# Patient Record
Sex: FEMALE | Race: OTHER | NOT HISPANIC OR LATINO
[De-identification: names, ages, dates, MRNs, and addresses within clinical notes are randomized per-mention and may not be internally consistent; named-entity substitution may affect disease eponyms.]

---
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
## 2020-09-03 NOTE — ED PROVIDER NOTE - CPE EDP PSYCH NORM
- - - Dorsal Nasal Flap Text: The defect edges were debeveled with a #15 scalpel blade.  Given the location of the defect and the proximity to free margins a dorsal nasal flap was deemed most appropriate.  Using a sterile surgical marker, an appropriate dorsal nasal flap was drawn around the defect.    The area thus outlined was incised deep to adipose tissue with a #15 scalpel blade.  The skin margins were undermined to an appropriate distance in all directions utilizing iris scissors.

## 2021-04-13 ENCOUNTER — RESULT REVIEW (OUTPATIENT)
Age: 21
End: 2021-04-13

## 2021-05-28 ENCOUNTER — TRANSCRIPTION ENCOUNTER (OUTPATIENT)
Age: 21
End: 2021-05-28

## 2021-11-03 ENCOUNTER — TRANSCRIPTION ENCOUNTER (OUTPATIENT)
Age: 21
End: 2021-11-03

## 2021-11-21 ENCOUNTER — TRANSCRIPTION ENCOUNTER (OUTPATIENT)
Age: 21
End: 2021-11-21

## 2021-11-23 ENCOUNTER — EMERGENCY (EMERGENCY)
Facility: HOSPITAL | Age: 21
LOS: 1 days | Discharge: HOME | End: 2021-11-23
Attending: EMERGENCY MEDICINE | Admitting: EMERGENCY MEDICINE
Payer: COMMERCIAL

## 2021-11-23 ENCOUNTER — APPOINTMENT (OUTPATIENT)
Dept: OTOLARYNGOLOGY | Facility: CLINIC | Age: 21
End: 2021-11-23
Payer: COMMERCIAL

## 2021-11-23 VITALS
WEIGHT: 149.47 LBS | OXYGEN SATURATION: 97 % | HEIGHT: 63 IN | RESPIRATION RATE: 16 BRPM | HEART RATE: 90 BPM | SYSTOLIC BLOOD PRESSURE: 125 MMHG | TEMPERATURE: 98 F | DIASTOLIC BLOOD PRESSURE: 78 MMHG

## 2021-11-23 VITALS
HEART RATE: 81 BPM | DIASTOLIC BLOOD PRESSURE: 72 MMHG | BODY MASS INDEX: 24.8 KG/M2 | SYSTOLIC BLOOD PRESSURE: 111 MMHG | HEIGHT: 63 IN | WEIGHT: 140 LBS

## 2021-11-23 VITALS
RESPIRATION RATE: 17 BRPM | SYSTOLIC BLOOD PRESSURE: 99 MMHG | HEART RATE: 88 BPM | DIASTOLIC BLOOD PRESSURE: 65 MMHG | TEMPERATURE: 98 F | OXYGEN SATURATION: 98 %

## 2021-11-23 DIAGNOSIS — J03.90 ACUTE TONSILLITIS, UNSPECIFIED: ICD-10-CM

## 2021-11-23 LAB
ALBUMIN SERPL ELPH-MCNC: 3.3 G/DL — SIGNIFICANT CHANGE UP (ref 3.3–5)
ALP SERPL-CCNC: 73 U/L — SIGNIFICANT CHANGE UP (ref 30–120)
ALT FLD-CCNC: 14 U/L DA — SIGNIFICANT CHANGE UP (ref 10–60)
ANION GAP SERPL CALC-SCNC: 13 MMOL/L — SIGNIFICANT CHANGE UP (ref 5–17)
AST SERPL-CCNC: 12 U/L — SIGNIFICANT CHANGE UP (ref 10–40)
BASOPHILS # BLD AUTO: 0.06 K/UL — SIGNIFICANT CHANGE UP (ref 0–0.2)
BASOPHILS NFR BLD AUTO: 0.6 % — SIGNIFICANT CHANGE UP (ref 0–2)
BILIRUB SERPL-MCNC: 0.4 MG/DL — SIGNIFICANT CHANGE UP (ref 0.2–1.2)
BUN SERPL-MCNC: 6 MG/DL — LOW (ref 7–23)
CALCIUM SERPL-MCNC: 8.5 MG/DL — SIGNIFICANT CHANGE UP (ref 8.4–10.5)
CHLORIDE SERPL-SCNC: 99 MMOL/L — SIGNIFICANT CHANGE UP (ref 96–108)
CO2 SERPL-SCNC: 25 MMOL/L — SIGNIFICANT CHANGE UP (ref 22–31)
CREAT SERPL-MCNC: 0.71 MG/DL — SIGNIFICANT CHANGE UP (ref 0.5–1.3)
EBV EA AB SER IA-ACNC: <5 U/ML — SIGNIFICANT CHANGE UP
EBV EA AB TITR SER IF: NEGATIVE — SIGNIFICANT CHANGE UP
EBV EA IGG SER-ACNC: NEGATIVE — SIGNIFICANT CHANGE UP
EBV NA IGG SER IA-ACNC: <3 U/ML — SIGNIFICANT CHANGE UP
EBV PATRN SPEC IB-IMP: SIGNIFICANT CHANGE UP
EBV VCA IGG AVIDITY SER QL IA: POSITIVE
EBV VCA IGM SER IA-ACNC: 32 U/ML — HIGH
EBV VCA IGM SER IA-ACNC: >160 U/ML — HIGH
EBV VCA IGM TITR FLD: POSITIVE
EOSINOPHIL # BLD AUTO: 0.02 K/UL — SIGNIFICANT CHANGE UP (ref 0–0.5)
EOSINOPHIL NFR BLD AUTO: 0.2 % — SIGNIFICANT CHANGE UP (ref 0–6)
GLUCOSE SERPL-MCNC: 114 MG/DL — HIGH (ref 70–99)
HCG UR QL: NEGATIVE — SIGNIFICANT CHANGE UP
HCT VFR BLD CALC: 34.1 % — LOW (ref 34.5–45)
HETEROPH AB TITR SER AGGL: NEGATIVE — SIGNIFICANT CHANGE UP
HGB BLD-MCNC: 11.8 G/DL — SIGNIFICANT CHANGE UP (ref 11.5–15.5)
IMM GRANULOCYTES NFR BLD AUTO: 0.3 % — SIGNIFICANT CHANGE UP (ref 0–1.5)
LYMPHOCYTES # BLD AUTO: 3.31 K/UL — HIGH (ref 1–3.3)
LYMPHOCYTES # BLD AUTO: 33.3 % — SIGNIFICANT CHANGE UP (ref 13–44)
MCHC RBC-ENTMCNC: 29.9 PG — SIGNIFICANT CHANGE UP (ref 27–34)
MCHC RBC-ENTMCNC: 34.6 GM/DL — SIGNIFICANT CHANGE UP (ref 32–36)
MCV RBC AUTO: 86.3 FL — SIGNIFICANT CHANGE UP (ref 80–100)
MONOCYTES # BLD AUTO: 1.02 K/UL — HIGH (ref 0–0.9)
MONOCYTES NFR BLD AUTO: 10.3 % — SIGNIFICANT CHANGE UP (ref 2–14)
NEUTROPHILS # BLD AUTO: 5.51 K/UL — SIGNIFICANT CHANGE UP (ref 1.8–7.4)
NEUTROPHILS NFR BLD AUTO: 55.3 % — SIGNIFICANT CHANGE UP (ref 43–77)
NRBC # BLD: 0 /100 WBCS — SIGNIFICANT CHANGE UP (ref 0–0)
PLATELET # BLD AUTO: 232 K/UL — SIGNIFICANT CHANGE UP (ref 150–400)
POTASSIUM SERPL-MCNC: 3.3 MMOL/L — LOW (ref 3.5–5.3)
POTASSIUM SERPL-SCNC: 3.3 MMOL/L — LOW (ref 3.5–5.3)
PROT SERPL-MCNC: 7.3 G/DL — SIGNIFICANT CHANGE UP (ref 6–8.3)
RBC # BLD: 3.95 M/UL — SIGNIFICANT CHANGE UP (ref 3.8–5.2)
RBC # FLD: 12 % — SIGNIFICANT CHANGE UP (ref 10.3–14.5)
SODIUM SERPL-SCNC: 137 MMOL/L — SIGNIFICANT CHANGE UP (ref 135–145)
WBC # BLD: 9.95 K/UL — SIGNIFICANT CHANGE UP (ref 3.8–10.5)
WBC # FLD AUTO: 9.95 K/UL — SIGNIFICANT CHANGE UP (ref 3.8–10.5)

## 2021-11-23 PROCEDURE — 99284 EMERGENCY DEPT VISIT MOD MDM: CPT | Mod: 25

## 2021-11-23 PROCEDURE — 86665 EPSTEIN-BARR CAPSID VCA: CPT

## 2021-11-23 PROCEDURE — 96365 THER/PROPH/DIAG IV INF INIT: CPT | Mod: XU

## 2021-11-23 PROCEDURE — 85025 COMPLETE CBC W/AUTO DIFF WBC: CPT

## 2021-11-23 PROCEDURE — 99203 OFFICE O/P NEW LOW 30 MIN: CPT

## 2021-11-23 PROCEDURE — 86664 EPSTEIN-BARR NUCLEAR ANTIGEN: CPT

## 2021-11-23 PROCEDURE — 86663 EPSTEIN-BARR ANTIBODY: CPT

## 2021-11-23 PROCEDURE — 96366 THER/PROPH/DIAG IV INF ADDON: CPT

## 2021-11-23 PROCEDURE — 86308 HETEROPHILE ANTIBODY SCREEN: CPT

## 2021-11-23 PROCEDURE — 80053 COMPREHEN METABOLIC PANEL: CPT

## 2021-11-23 PROCEDURE — 70491 CT SOFT TISSUE NECK W/DYE: CPT | Mod: 26,MA

## 2021-11-23 PROCEDURE — 96375 TX/PRO/DX INJ NEW DRUG ADDON: CPT

## 2021-11-23 PROCEDURE — 70491 CT SOFT TISSUE NECK W/DYE: CPT | Mod: MA

## 2021-11-23 PROCEDURE — 36415 COLL VENOUS BLD VENIPUNCTURE: CPT

## 2021-11-23 PROCEDURE — 99285 EMERGENCY DEPT VISIT HI MDM: CPT

## 2021-11-23 PROCEDURE — 81025 URINE PREGNANCY TEST: CPT

## 2021-11-23 RX ORDER — CLINDAMYCIN HYDROCHLORIDE 300 MG/1
300 CAPSULE ORAL
Qty: 30 | Refills: 2 | Status: ACTIVE | COMMUNITY
Start: 2021-11-23 | End: 1900-01-01

## 2021-11-23 RX ORDER — SODIUM CHLORIDE 9 MG/ML
1000 INJECTION INTRAMUSCULAR; INTRAVENOUS; SUBCUTANEOUS ONCE
Refills: 0 | Status: COMPLETED | OUTPATIENT
Start: 2021-11-23 | End: 2021-11-23

## 2021-11-23 RX ORDER — KETOROLAC TROMETHAMINE 30 MG/ML
30 SYRINGE (ML) INJECTION ONCE
Refills: 0 | Status: DISCONTINUED | OUTPATIENT
Start: 2021-11-23 | End: 2021-11-23

## 2021-11-23 RX ORDER — METHYLPREDNISOLONE 4 MG/1
4 TABLET ORAL
Qty: 1 | Refills: 1 | Status: ACTIVE | COMMUNITY
Start: 2021-11-23 | End: 1900-01-01

## 2021-11-23 RX ORDER — DEXAMETHASONE 0.5 MG/5ML
10 ELIXIR ORAL ONCE
Refills: 0 | Status: COMPLETED | OUTPATIENT
Start: 2021-11-23 | End: 2021-11-23

## 2021-11-23 RX ADMIN — Medication 100 MILLIGRAM(S): at 07:48

## 2021-11-23 RX ADMIN — Medication 10 MILLIGRAM(S): at 07:49

## 2021-11-23 RX ADMIN — SODIUM CHLORIDE 1000 MILLILITER(S): 9 INJECTION INTRAMUSCULAR; INTRAVENOUS; SUBCUTANEOUS at 05:35

## 2021-11-23 RX ADMIN — Medication 30 MILLIGRAM(S): at 05:35

## 2021-11-23 RX ADMIN — Medication 30 MILLIGRAM(S): at 07:49

## 2021-11-23 RX ADMIN — SODIUM CHLORIDE 1000 MILLILITER(S): 9 INJECTION INTRAMUSCULAR; INTRAVENOUS; SUBCUTANEOUS at 07:49

## 2021-11-23 RX ADMIN — Medication 102 MILLIGRAM(S): at 05:35

## 2021-11-23 NOTE — PHYSICAL EXAM
[Midline] : trachea located in midline position [de-identified] : 3+ inflamed tonsils with white exudate [Normal] : no rashes [de-identified] : Bilateral tender cervical lymphadenopathy

## 2021-11-23 NOTE — ED PROVIDER NOTE - NSFOLLOWUPINSTRUCTIONS_ED_ALL_ED_FT
Tonsillitis    WHAT YOU NEED TO KNOW:    What is tonsillitis? Tonsillitis is inflammation of your tonsils. Tonsils are the lumps of tissue on both sides of the back of your throat. Tonsils are part of your immune system. They help you fight infections. Recurrent tonsillitis is when you have tonsillitis many times in 1 year. Chronic tonsillitis is when you have a sore throat that lasts 3 months or longer.     Mouth Anatomy         What causes tonsillitis? Tonsillitis may be caused by a bacterial or a viral infection. Tonsillitis can spread from an infected person to others through coughing, sneezing, or touching. It can also spread through kissing or sharing food and drinks.     What are the signs and symptoms of tonsillitis?   •Severe sore throat      •Red, swollen tonsils      •Painful swallowing      •Fever and chills      •Bad breath      •White spots on the tonsils      How is tonsillitis diagnosed? Your healthcare provider will examine your ears, nose, and throat. He will ask about your symptoms. You may need any of the following:   •A throat culture may show which germ is causing your illness. A cotton swab is rubbed against the back of your throat.      •Blood tests may show if you have an infection caused by bacteria or a virus.      How is tonsillitis treated? Treatment may decrease your signs and symptoms. Treatment also may lower the number of times that you get tonsillitis in a year. You may need any of the following:   •Acetaminophen decreases pain and fever. It is available without a doctor's order. Ask how much to take and how often to take it. Follow directions. Acetaminophen can cause liver damage if not taken correctly.      •NSAIDs, such as ibuprofen, help decrease swelling, pain, and fever. This medicine is available with or without a doctor's order. NSAIDs can cause stomach bleeding or kidney problems in certain people. If you take blood thinner medicine, always ask your healthcare provider if NSAIDs are safe for you. Always read the medicine label and follow directions.      •Antibiotics help treat a bacterial infection.      •A tonsillectomy is surgery to remove your tonsils. You may need surgery if you have chronic or recurrent tonsillitis. Surgery may also be done if antibiotics are not working.      How can I manage my symptoms?   •Rest when you feel it is needed. Slowly start to do more each day. Return to your daily activities as directed.       •Drink liquids as directed. You may need to drink more liquid than usual to prevent dehydration. Ask how much liquid to drink each day and which liquids are best for you.       •Gargle with warm salt water. This may help decrease throat pain. Mix 1 teaspoon of salt in 8 ounces of warm water. Ask how often you should do this.      •Prevent the spread of germs. Wash your hands often. Do not share food or drinks with anyone. You may be able to return to work when you feel better and your fever is gone for at least 24 hours.      Call 911 for the following:   •You have trouble breathing because your tonsils are swollen.          When should I contact my healthcare provider?   •You have a fever.      •Your pain gets worse or does not get better after you take pain medicine.      •Your sore throat is not better after you have finished antibiotic treatment.      •You have trouble sleeping and wake up trying to catch your breath.      •You have questions or concerns about your condition or care.      CARE AGREEMENT:    You have the right to help plan your care. Learn about your health condition and how it may be treated. Discuss treatment options with your healthcare providers to decide what care you want to receive. You always have the right to refuse treatment.

## 2021-11-23 NOTE — ED PROVIDER NOTE - CARE PROVIDER_API CALL
Jude Skelton)  [facility] [address], [address]  [place] [address]  Phone: [phone]  Fax: [phone]  Follow Up Time:    Jude Skelton)  [facility] [facility], [address]  [place] [address]  Phone: [phone]  Fax: [phone]  Scheduled Appointment: 11/23/2021 12:00 PM

## 2021-11-23 NOTE — ASSESSMENT
[FreeTextEntry1] : Bev Ramos presents with an episode of acute tonsillitis. She has had some benefit from IV steroid and clindamycin in the ED on 11/21. Will treat her with a course of oral antibiotics and steroids.\par \par - Start clindamycin 300 mg TID x 10 days.\par - Start medrol dose pack\par - Follow up in 2 weeks.

## 2021-11-23 NOTE — DATA REVIEWED
[de-identified] : CT Neck:\par \par FINDINGS:\par The palatine tonsils are markedly enlarged and contacting each other in the midline, so-called kissing tonsils. There is overall striated enhancement pattern to the tonsils. There is a discrete 6 x 3 mm region of low-attenuation along the lateral aspect of the right palatine tonsil which may have subtle peripheral enhancement. Less well-defined vague area of low-attenuation is seen more medially within the tonsil measuring about 1.3 cm. There is moderate mass effect on the oropharyngeal airway. There is asymmetric stranding/inflammation in the right parapharyngeal fat. The nasopharyngeal soft tissues and lingual tonsils are also symmetrically prominent. There is no evidence of retropharyngeal abscess or edema. The supraglottic, glottic and subglottic airways are unremarkable.\par \par Evaluation of the various allison stations demonstrates prominent bilateral level 2A and 2B lymph nodes, largest being jugulodigastric digastric nodes measuring 3.5 cm on the right and 3.0 cm the left. There is no suppurative adenopathy.\par \par The parotid, submandibular, and thyroid glands are unremarkable.\par \par The carotid and vertebral arteries are patent.\par \par The visualized intracranial and intraorbital compartments are unremarkable.\par \par There is no lucent or sclerotic lesion.\par \par The visualized lung apices are clear.\par \par IMPRESSION:\par Tonsillitis with moderate airway narrowing.\par 6 x 3 mm well-defined cystic area in the lateral right palatine tonsil which could be a dilated tonsillar crypt or early peritonsillar formation. Phlegmonous changes with early abscess formation question in the medial right palatine tonsil.\par \par Reactive right parapharyngeal edema and upper cervical adenopathy.

## 2021-11-23 NOTE — ED ADULT TRIAGE NOTE - CHIEF COMPLAINT QUOTE
'My throat is swollen, I went to urgent care on Sunday, they said its not strep ,it's not covid, my throat hurts', c/o throat pain x1 week, denies fever.

## 2021-11-23 NOTE — ED PROVIDER NOTE - PATIENT PORTAL LINK FT
You can access the FollowMyHealth Patient Portal offered by [facility] by registering at the following website: [email or web address]. By joining [facility]’s FollowMyHealth portal, you will also be able to view your health information using other applications (apps) compatible with our system.

## 2021-11-23 NOTE — ED PROVIDER NOTE - NSICDXFAMILYHX_GEN_ALL_CORE_FT
FAMILY HISTORY:  Mother  Still living? Yes, Estimated age: Age Unknown  Family history of major depression, Age at diagnosis: Age Unknown

## 2021-11-23 NOTE — HISTORY OF PRESENT ILLNESS
[de-identified] : Bev Ramos is a 22 yo female who was referred by the ED For tonsillitis. She states that for the past 1.5 weeks, she had sore throat. In the last 2-3 days, she felt tonsillar swelling. She was seen by urgent care, and was started on amoxicillin. She had increased pain and was seen by [facility]. She was given topical steroid which seemed to help. However, last night, she had recurrence of pain with increased odynophagia. She noticed some difficulty breathing because of nasal congestion. She denies dysphagia. She was seen in the ED, and was given IV steroid and clindamycin. She had a CT which showed bilateral tonsillar hypertrophy with subcentimer phlegmonous change in the right peritonsillar space as well as bilateral cervical lymphadenopathy. Region of low attenuation wthin rihgt tonsil.\par \par She denies any fevers or chills. Rapid strep was negative at urgent care. Her monospot is pending from [place] [facility]. Her Covid test was negative. She denies history of recurrent tonsillitis. She denies history of peritonsillar abscess. She denies snoring or pauses in breathing at night. She notes bilateral cervical lymphadenopathy. She has been prescribed clindamycin.

## 2021-11-23 NOTE — ED PROVIDER NOTE - ENMT, MLM
Airway patent, tonsils equally enlarged bilaterally with small patency between them, bilateral tonsillar exudates, no drooling

## 2021-11-23 NOTE — ED PROVIDER NOTE - NOTES
discussed case, reviewed images, no need for transfer, likely viral but would cover with clinda, will help arrange f/u for today in [place] [facility] - will call back shortly discussed case with Dr. Castaneda, reviewed images, no need for transfer, likely viral but would cover with clinda, will help arrange f/u for today in [place] [facility] - will call back shortly

## 2021-11-23 NOTE — REVIEW OF SYSTEMS
[Seasonal Allergies] : seasonal allergies [Nasal Congestion] : nasal congestion [Swelling Neck] : swelling neck [Swelling Face] : face swelling [Swollen Glands] : swollen glands [Negative] : Endocrine [FreeTextEntry7] : difficulty swallowing  [FreeTextEntry1] : headache

## 2021-11-23 NOTE — ED PROVIDER NOTE - OBJECTIVE STATEMENT
21 y.o. F c/o tonsil swelling, started with sore throat about 1 wk ago, increased pain 2d ago, seen at urgent care ([facility]), had negative covid, negative strep, was started on amoxicillin 500mg BID. later that day went to [facility], states they sprayed a steroid on her tonsils, now in ED for continued swelling of tonsils and pain, 21 y.o. F c/o tonsil swelling, started with sore throat about 1 wk ago, increased pain 2d ago, seen at urgent care ([facility]), had negative covid, negative strep, was started on amoxicillin 500mg BID. later that day went to [facility] for [facility]il swelling, states they sprayed a steroid on her tonsils, now in ED for continued swelling of tonsils and pain, is having pain with swallowing, spitting out saliva, not SOB, is not eating solid food now, but can still drink liquids

## 2021-11-23 NOTE — ED PROVIDER NOTE - PROVIDER TOKENS
PROVIDER:[TOKEN:[[id number]]] PROVIDER:[TOKEN:[[id number]],SCHEDULEDAPPT:[11/23/2021],SCHEDULEDAPPTTIME:[12:00 PM]]

## 2021-11-23 NOTE — ED PROVIDER NOTE - CLINICAL SUMMARY MEDICAL DECISION MAKING FREE TEXT BOX
tonsillitis - worsening, pain to swallow, no sob - iv, labs, mono test, ct neck, iv decadron, iv toradol, NS bolus

## 2021-12-02 ENCOUNTER — TRANSCRIPTION ENCOUNTER (OUTPATIENT)
Age: 21
End: 2021-12-02

## 2021-12-03 ENCOUNTER — TRANSCRIPTION ENCOUNTER (OUTPATIENT)
Age: 21
End: 2021-12-03

## 2021-12-07 ENCOUNTER — APPOINTMENT (OUTPATIENT)
Dept: OTOLARYNGOLOGY | Facility: CLINIC | Age: 21
End: 2021-12-07

## 2021-12-23 ENCOUNTER — TRANSCRIPTION ENCOUNTER (OUTPATIENT)
Age: 21
End: 2021-12-23

## 2022-03-23 ENCOUNTER — TRANSCRIPTION ENCOUNTER (OUTPATIENT)
Age: 22
End: 2022-03-23

## 2022-09-01 ENCOUNTER — RESULT REVIEW (OUTPATIENT)
Age: 22
End: 2022-09-01

## 2022-10-26 ENCOUNTER — NON-APPOINTMENT (OUTPATIENT)
Age: 22
End: 2022-10-26

## 2022-10-28 ENCOUNTER — NON-APPOINTMENT (OUTPATIENT)
Age: 22
End: 2022-10-28

## 2023-01-30 ENCOUNTER — EMERGENCY (EMERGENCY)
Facility: HOSPITAL | Age: 23
LOS: 1 days | Discharge: HOME | End: 2023-01-30
Attending: EMERGENCY MEDICINE | Admitting: EMERGENCY MEDICINE
Payer: COMMERCIAL

## 2023-01-30 ENCOUNTER — OUTPATIENT (OUTPATIENT)
Dept: OUTPATIENT SERVICES | Facility: HOSPITAL | Age: 23
LOS: 1 days | Discharge: STILL ADMITTED OR ADMINISTRATIVE | End: 2023-01-30

## 2023-01-30 VITALS
DIASTOLIC BLOOD PRESSURE: 76 MMHG | TEMPERATURE: 98 F | HEART RATE: 78 BPM | OXYGEN SATURATION: 99 % | SYSTOLIC BLOOD PRESSURE: 134 MMHG | RESPIRATION RATE: 18 BRPM

## 2023-01-30 VITALS
SYSTOLIC BLOOD PRESSURE: 144 MMHG | TEMPERATURE: 98 F | DIASTOLIC BLOOD PRESSURE: 103 MMHG | OXYGEN SATURATION: 98 % | HEART RATE: 84 BPM | RESPIRATION RATE: 19 BRPM

## 2023-01-30 DIAGNOSIS — F19.94 OTHER PSYCHOACTIVE SUBSTANCE USE, UNSPECIFIED WITH PSYCHOACTIVE SUBSTANCE-INDUCED MOOD DISORDER: ICD-10-CM

## 2023-01-30 DIAGNOSIS — F32.A DEPRESSION, UNSPECIFIED: ICD-10-CM

## 2023-01-30 DIAGNOSIS — F33.0 MAJOR DEPRESSIVE DISORDER, RECURRENT, MILD: ICD-10-CM

## 2023-01-30 DIAGNOSIS — F10.10 ALCOHOL ABUSE, UNCOMPLICATED: ICD-10-CM

## 2023-01-30 LAB
ALBUMIN SERPL ELPH-MCNC: 4.3 G/DL — SIGNIFICANT CHANGE UP (ref 3.3–5)
ALP SERPL-CCNC: 73 U/L — SIGNIFICANT CHANGE UP (ref 40–120)
ALT FLD-CCNC: 21 U/L — SIGNIFICANT CHANGE UP (ref 4–33)
ANION GAP SERPL CALC-SCNC: 12 MMOL/L — SIGNIFICANT CHANGE UP (ref 7–14)
APPEARANCE UR: ABNORMAL
AST SERPL-CCNC: 31 U/L — SIGNIFICANT CHANGE UP (ref 4–32)
BACTERIA # UR AUTO: SIGNIFICANT CHANGE UP
BILIRUB SERPL-MCNC: 0.6 MG/DL — SIGNIFICANT CHANGE UP (ref 0.2–1.2)
BILIRUB UR-MCNC: NEGATIVE — SIGNIFICANT CHANGE UP
BUN SERPL-MCNC: 5 MG/DL — LOW (ref 7–23)
CALCIUM SERPL-MCNC: 9.4 MG/DL — SIGNIFICANT CHANGE UP (ref 8.4–10.5)
CHLORIDE SERPL-SCNC: 103 MMOL/L — SIGNIFICANT CHANGE UP (ref 98–107)
CO2 SERPL-SCNC: 22 MMOL/L — SIGNIFICANT CHANGE UP (ref 22–31)
COLOR SPEC: YELLOW — SIGNIFICANT CHANGE UP
CREAT SERPL-MCNC: 0.68 MG/DL — SIGNIFICANT CHANGE UP (ref 0.5–1.3)
DIFF PNL FLD: NEGATIVE — SIGNIFICANT CHANGE UP
EGFR: 126 ML/MIN/1.73M2 — SIGNIFICANT CHANGE UP
EPI CELLS # UR: 5 /HPF — SIGNIFICANT CHANGE UP (ref 0–5)
FLUAV AG NPH QL: SIGNIFICANT CHANGE UP
FLUBV AG NPH QL: SIGNIFICANT CHANGE UP
GLUCOSE SERPL-MCNC: 77 MG/DL — SIGNIFICANT CHANGE UP (ref 70–99)
GLUCOSE UR QL: NEGATIVE — SIGNIFICANT CHANGE UP
HCG SERPL-ACNC: <5 MIU/ML — SIGNIFICANT CHANGE UP
HCT VFR BLD CALC: 33.1 % — LOW (ref 34.5–45)
HGB BLD-MCNC: 11.3 G/DL — LOW (ref 11.5–15.5)
KETONES UR-MCNC: ABNORMAL
LEUKOCYTE ESTERASE UR-ACNC: ABNORMAL
MCHC RBC-ENTMCNC: 29.1 PG — SIGNIFICANT CHANGE UP (ref 27–34)
MCHC RBC-ENTMCNC: 34.1 GM/DL — SIGNIFICANT CHANGE UP (ref 32–36)
MCV RBC AUTO: 85.3 FL — SIGNIFICANT CHANGE UP (ref 80–100)
NITRITE UR-MCNC: NEGATIVE — SIGNIFICANT CHANGE UP
NRBC # BLD: 0 /100 WBCS — SIGNIFICANT CHANGE UP (ref 0–0)
NRBC # FLD: 0 K/UL — SIGNIFICANT CHANGE UP (ref 0–0)
PCP SPEC-MCNC: SIGNIFICANT CHANGE UP
PH UR: 5.5 — SIGNIFICANT CHANGE UP (ref 5–8)
PLATELET # BLD AUTO: 293 K/UL — SIGNIFICANT CHANGE UP (ref 150–400)
POTASSIUM SERPL-MCNC: 3.7 MMOL/L — SIGNIFICANT CHANGE UP (ref 3.5–5.3)
POTASSIUM SERPL-SCNC: 3.7 MMOL/L — SIGNIFICANT CHANGE UP (ref 3.5–5.3)
PROT SERPL-MCNC: 6.8 G/DL — SIGNIFICANT CHANGE UP (ref 6–8.3)
PROT UR-MCNC: NEGATIVE — SIGNIFICANT CHANGE UP
RBC # BLD: 3.88 M/UL — SIGNIFICANT CHANGE UP (ref 3.8–5.2)
RBC # FLD: 13.2 % — SIGNIFICANT CHANGE UP (ref 10.3–14.5)
RBC CASTS # UR COMP ASSIST: 1 /HPF — SIGNIFICANT CHANGE UP (ref 0–4)
RSV RNA NPH QL NAA+NON-PROBE: SIGNIFICANT CHANGE UP
SARS-COV-2 RNA SPEC QL NAA+PROBE: SIGNIFICANT CHANGE UP
SODIUM SERPL-SCNC: 137 MMOL/L — SIGNIFICANT CHANGE UP (ref 135–145)
SP GR SPEC: 1.01 — SIGNIFICANT CHANGE UP (ref 1.01–1.05)
TOXICOLOGY SCREEN, DRUGS OF ABUSE, SERUM RESULT: SIGNIFICANT CHANGE UP
TSH SERPL-MCNC: 0.88 UIU/ML — SIGNIFICANT CHANGE UP (ref 0.27–4.2)
UROBILINOGEN FLD QL: SIGNIFICANT CHANGE UP
WBC # BLD: 6.67 K/UL — SIGNIFICANT CHANGE UP (ref 3.8–10.5)
WBC # FLD AUTO: 6.67 K/UL — SIGNIFICANT CHANGE UP (ref 3.8–10.5)
WBC UR QL: 6 /HPF — SIGNIFICANT CHANGE UP (ref 0–5)

## 2023-01-30 PROCEDURE — 90792 PSYCH DIAG EVAL W/MED SRVCS: CPT | Mod: GC

## 2023-01-30 PROCEDURE — 99285 EMERGENCY DEPT VISIT HI MDM: CPT

## 2023-01-30 NOTE — ED PROVIDER NOTE - ATTENDING CONTRIBUTION TO CARE
22 year old with SI in setting of psychosocial stressor. will obtian psych cx for possible admission

## 2023-01-30 NOTE — ED BEHAVIORAL HEALTH NOTE - BEHAVIORAL HEALTH NOTE
COLLATERAL FROM HER MOTHER, RAZAI NAM ([phone])  who reported that the Pt has a hx of depression and anxiety; with one prior psych admission to [facility] in 2018 following worsening depression and aborted SA via OD on pills. currently in treatment with a Dr Javon Mckinnon who prescribes her with Celexa 10mg daily; mother reports Pt has been off meds for the past 2 weeks now. Pt is also in therapy with a Sophy Saeed ([phone]) [facility]. Pt has hx of drinking alcohol daily for the past 1 yr. no reported hx of withdrawal/ no DTs; no reported rehabs/ detox. no pertinent medical issues.     mother reports that the Pt has had on & off bouts of depression and anxiety since childhood. Pt has poor coping skills - easily overwhelmed and does not respond to stressors robustly. depressive and anxiety symptoms reportedly lasting for </= a day only. depression characterized as dysphoria, crying spells, sleep disturbances, low energy level and no reported changes in appetite.. no hopelessness/ helplessness/ worthlessness. denied any passive or active SI or HI not until last night, wherein Pt told mother that she had been feeling "unsafe at home"; harboring SI but no intention nor any plans. denied any HI. along with the depression, mother reported that pt would also feel anxious - especially triggered by psychosocial stressors. Pt reportedly would also cry whenever she feels anxious.  denied experiencing any specific anxiety disorder symptoms. mother denied Pt exhibiting any manic symptoms like sustained elevated mood or easy irritability; no reported grandiosity/ racing thoughts/ increased goal directed activities or engaged in risk taking behavior/ no pressured speech/ denied any increased in energy level causing sleep disruption).  mother did not endorse any psychotic symptoms like paranoia/ no bizarre behavior/ no perceptual disturbances.      mother is not suspecting any other illicit substances. Pt reportedly occasionally vaping.  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 recently, conflict with best friends whilst on vacation in [place]. mother claimed that Pt had looked forward to that vacation however, became distraught when herself and best friends had disagreements and subsequently evolved into "a fight". last friday night, Pt called mother, crying - claiming that she and her best friends had a fight. abruptly terminated this vacation and came back home last sunday AM. noted to be dysphoric, periodically crying yesterday. last night, Pt told mother that she was having SI. she requested to be taken to [facility] to "feel safe there". claims that Pt told her that she (the Pt) had a "good experience" during her [facility] admission.

## 2023-01-30 NOTE — ED BEHAVIORAL HEALTH ASSESSMENT NOTE - NSBHMSEIMPULSE_PSY_A_CORE
[Restricted in physically strenuous activity but ambulatory and able to carry out work of a light or sedentary nature] : Status 1- Restricted in physically strenuous activity but ambulatory and able to carry out work of a light or sedentary nature, e.g., light house work, office work [Normal] : normal appearance, no rash, nodules, vesicles, ulcers, erythema [de-identified] : left facial palsy Normal

## 2023-01-30 NOTE — ED PROVIDER NOTE - OBJECTIVE STATEMENT
22 year old female pmh depression presents from crisis center. per patient she is having si but did not have any attempt.  patient recently got into an argument on vacation.  drinks 1 bottle of wine daily.  complains of tremor. no chest pain or sob. no hi

## 2023-01-30 NOTE — ED BEHAVIORAL HEALTH ASSESSMENT NOTE - OTHER PAST PSYCHIATRIC HISTORY (INCLUDE DETAILS REGARDING ONSET, COURSE OF ILLNESS, INPATIENT/OUTPATIENT TREATMENT)
1 past psychiatric hospitalization at [facility] in 2018 after intentional overdose on Wellbutrin (took 7 extra pills) in attempt to end life

## 2023-01-30 NOTE — ED PROVIDER NOTE - PROGRESS NOTE DETAILS
Bertha Bethea M.D. 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 Pt endorse SI, wanting to overdose on pills like she did in 2018. Pt is tearful and states she is "embarrassed". History obtained from patient with mother outside of room

## 2023-01-30 NOTE — ED ADULT NURSE NOTE - OBJECTIVE STATEMENT
Pt reports SI for last 2 days also drinking everyday because she is too stressed, she is in school for her Masters and it is too much of stress. Pt was supposed to be on Celexa but doesn't take it because she "doesn't like how she feels after taking them". Pt has psych hx, bypolar, pt was previously hospitalized for psych.  Pt is cooperative, stated she has plan how to kill herself - OD on celexa. Pt is shaking in bed, crying, feels embarrassed.

## 2023-01-30 NOTE — ED PROVIDER NOTE - PATIENT PORTAL LINK FT
You can access the FollowMyHealth Patient Portal offered by  by registering at the following website: [email or web address]. By joining [employer]’s FollowMyHealth portal, you will also be able to view your health information using other applications (apps) compatible with our system.

## 2023-01-30 NOTE — ED BEHAVIORAL HEALTH ASSESSMENT NOTE - NSBHMSEHYG_PSY_A_CORE
HPI:    Dary Becerra is a 44year old female here today for hospital follow up.    She was discharged from Inpatient hospital, [facility] AND [facility]  to Home   Admission Date: 11/21/17   Discharge Date: 11/25/17  Hospital Discharge Diagnosis: No Value exis has No Known Allergies. Current Meds:    Current Outpatient Prescriptions on File Prior to Visit:  ergocalciferol 74713 units Oral Cap Take 1 capsule (50,000 Units total) by mouth once a week.    FREESTYLE LITE TEST In Vitro Strip USE TWICE DAILY AS DIRE range of motion of extremities  NEURO: denies headaches, denies dizziness, denies weakness  PSYCHE: denies depression or anxiety  HEMATOLOGIC: denies hx of anemia, or bruising, denies bleeding  ENDOCRINE: denies thyroid history  ALL/ASTHMA: denies hx of al Dispense: 30 tablet; Refill: 0  - URINALYSIS, AUTO, W/O SCOPE    3. Encounter for completion of form with patient  Physical form to be completed     4.  Trichomoniasis  hasnt been taking flagyl due to her nausea   Advised to take full course   Test of cure Fair

## 2023-01-30 NOTE — ED ADULT TRIAGE NOTE - NS_BH TRG QUESTION7_ED_ALL_ED
Depression (without Suicidality or Psychosis)/Soha (includes Bipolar Disorder)/Anxiety (includes Panic, OCD)

## 2023-01-30 NOTE — ED BEHAVIORAL HEALTH ASSESSMENT NOTE - SUMMARY
Patient is a 21 y/o woman, employed at [employer], single, obtaining masters degree at [employer], no dependents, lives with family, with PPHx of Depression, anxiety, Cyclothymia? 1 past psych hospitalization ([facility] 2018) after SA by overdosing on Wellbutrin, no NSSIB, daily alcohol use, currently in treatment w/weekly therapy, sees psychiatrist Dr. Mckinnon, who was BIB self after going to [facility] for active SI w/thoughts of overdosing on Celexa. Patient was initially in medical ED to evaluate for possible alcohol withdrawal, no signs of withdrawal noted.    Patient presents with depressed mood, decreased energy, active SI w/thoughts of overdosing on prescribed Celexa pills. Patient also with poor sleep at night related to poor sleep hygiene. Symptoms are present in the context of increased alcohol use, poor coping skills, medication nonadherence. After discussion with family, patient able to undergo safety plan, has active plans to improve life including attending AA meetings, making appointment with psychiatrist, will decrease access to alcohol, will be adherent to medications for at least 6 weeks. Patient will likely be in a safe environment, as mother also agreed to lock up all medications at home, remove access to sharp objects. Patient is safe and stable for outpatient treatment, does not meet criteria for involuntary psychiatric admission. Mother and patient in agreement. Patient is a 21 y/o woman, employed at [employer], single, obtaining masters degree at [employer], no dependents, lives with family, with PPHx of Depression, anxiety, Cyclothymia? 1 past psych hospitalization ([facility] 2018) after SA by overdosing on Wellbutrin, no NSSIB, daily alcohol use, currently in treatment w/weekly therapy, sees psychiatrist Dr. Mckinnon, who was BIB self after going to [facility] for SI w/thoughts of overdosing on Celexa. Patient was initially in medical ED to evaluate for possible alcohol withdrawal, no signs of withdrawal noted.    Patient presents with depressed mood, decreased energy, has had SI w/thoughts of overdosing on prescribed Celexa pills. Patient also with poor sleep at night related to poor sleep hygiene. Symptoms are present in the context of increased alcohol use, poor coping skills, medication nonadherence. After discussion with family, patient able to undergo safety plan, has active plans to improve life including attending AA meetings, making appointment with psychiatrist, will decrease access to alcohol, will be adherent to medications for at least 6 weeks. Patient will likely be in a safe environment, as mother also agreed to lock up all medications at home, remove access to sharp objects. Patient is safe and stable for outpatient treatment, does not meet criteria for involuntary psychiatric admission. Mother and patient in agreement.

## 2023-01-30 NOTE — ED ADULT TRIAGE NOTE - CHIEF COMPLAINT QUOTE
patient c/o alcohol withdrawal and suicidal ideation x2 days. patient endorses having a plan. denies HI. last alcoholic drink was last night, drinks approximately 1 bottle of wine per day.

## 2023-01-30 NOTE — ED BEHAVIORAL HEALTH ASSESSMENT NOTE - REFERRAL / APPOINTMENT DETAILS
Patient to f/u with therapist Monday, weekly. Patient to make appointment with her outpatient psychiatrist.

## 2023-01-30 NOTE — ED BEHAVIORAL HEALTH ASSESSMENT NOTE - RISK ASSESSMENT
Risk factors:   Modifiable: current SI, active substance abuse, nonadherence with treatment  Nonmodifiable: h/o SA, h/o 1 psych admission    Protective factors: no current HIIP, no access to weapons, good physical health, engaged in work and school, domiciled, social supports, engaged in treatment, help-seeking behaviors    Overall, pt is at moderate risk of harm and does not meet criteria for involuntary psychiatric admission. Risk factors:   Modifiable: current SI, active substance abuse, nonadherence with treatment  Nonmodifiable: h/o SA, h/o 1 psych admission    Protective factors: no current HIIP, no access to weapons, good physical health, engaged in work and school, domiciled, social supports, engaged in treatment, help-seeking behaviors    Overall, pt is not at imminent risk of self harm and does not meet criteria for involuntary psychiatric admission. she was offered voluntary admission for which she refused

## 2023-01-30 NOTE — ED BEHAVIORAL HEALTH ASSESSMENT NOTE - DETAILS
took 7 extra tabs of Wellbutrin XR 150mg tabs Aunts on both side of family thoughts of overdosing on Celexa, did not make plan to do so See  safety plan note Self referred

## 2023-01-30 NOTE — ED BEHAVIORAL HEALTH ASSESSMENT NOTE - NSBHATTESTCOMMENTATTENDFT_PSY_A_CORE
22/F with hx of Depression, anxiety, and ?Cyclothymia; had 1 prior psych admission to [facility] in 2018 following SA by overdosing on Wellbutrin; with hx of daily alcohol use. she is currently in treatment w/ weekly therapy and follows up with a community psychiatrist. presented to the ED accompanied by mother after initially presenting to the [facility] due to SI w/thoughts of overdosing on Celexa. she was initially seen at the main medical ED to as there was concern for possible alcohol withdrawal - of which there was none.     at this time, endorses feeling sad along with associated symptoms of decreased energy level and poor sleep.  has had SI w/thoughts of overdosing on prescribed Celexa pills but NO INTENTION. Symptoms have been precipitated and perpetuated by ongoing multiple psychosocial stressors of which mother described as Pt feeling easily overwhelmed whenever she perceives inability to deal with the stressors.  Pt has also been self medicating with alcohol and has not been compliant with prescribed meds.      at this time, whilst she does present with depressive and anxiety symptoms, there has been no reported compromise towards Pt's ADLs and overall functionality.  currently is not actively or even passively suicidal or homicidal. is not manic nor psychotic.  she is not withdrawing from alcohol. is not delirious.  at this time, there is no justification to pursue involuntary psych admission for this Pt as she does not meet criteria for involuntary admission. she was however, offered voluntary admission for which she refused.  Pt was able to partake towards safety planning. Pt is safe and stable for outpatient treatment. Mother and Pt in agreement of plans recommended.

## 2023-01-30 NOTE — ED PROVIDER NOTE - NSFOLLOWUPINSTRUCTIONS_ED_ALL_ED_FT
Follow up with Follow up with  Clinician Name:	Dr. Mckinnon  Phone:	[phone]  Call Suicide Prevention Lifeline Phone: Suicide and Crisis Lifeline, call 981   Suicide Prevention Lifeline Phone: 2-568-037- BHRV (0242)    Return to the Emergency Department if you feel unsafe

## 2023-01-30 NOTE — ED BEHAVIORAL HEALTH ASSESSMENT NOTE - HPI (INCLUDE ILLNESS QUALITY, SEVERITY, DURATION, TIMING, CONTEXT, MODIFYING FACTORS, ASSOCIATED SIGNS AND SYMPTOMS)
Patient is a 21 y/o woman, employed at [employer], single, obtaining masters degree at [employer], no dependents, lives with family, with PPHx of Depression, anxiety, Cyclothymia? 1 past psych hospitalization ([facility] 2018) after SA by overdosing on Wellbutrin, no NSSIB, daily alcohol use, currently in treatment w/weekly therapy, sees psychiatrist Dr. Mckinnon, who was BIB self after going to [facility] for active SI w/thoughts of overdosing on Celexa. Patient was initially in medical ED to evaluate for possible alcohol withdrawal, no signs of withdrawal noted.    Patient seen with mother at bedside. Upon approach, patient tearful. She states that she feels tired, is under a lot of pressure and stress from school, work, having fight with friends during vacation in [place] this weekend, father and grandmother having health scares this year. She reports having depressed mood, poor energy, and increase in alcohol use (always drank daily, but now more consistently drinks 1/2 -1 bottle of wine). Patient reports poor sleep (4 hrs nightly), though takes naps during day, drinks alcohol at night which indicates poor sleep hygiene. Patient reports mood fluctuations throughout the day, fear of abandonment, often seeks for help. She reports having poor coping strategies, states she feels unwell whenever she has big change in life (graduations, working). Patient is a 23 y/o woman, employed at [employer], single, obtaining masters degree at [employer], no dependents, lives with family, with PPHx of Depression, anxiety, Cyclothymia? 1 past psych hospitalization ([facility] 2018) after SA by overdosing on Wellbutrin, no NSSIB, daily alcohol use, currently in treatment w/weekly therapy, sees psychiatrist Dr. Mckinnon, who was BIB self after going to [facility] for active SI w/thoughts of overdosing on Celexa. Patient was initially in medical ED to evaluate for possible alcohol withdrawal, no signs of withdrawal noted.    Patient seen with mother at bedside. Upon approach, patient tearful. She states that she feels tired, is under a lot of pressure and stress from school, work, having fight with friends during vacation in [place] this weekend, father and grandmother having health scares this year. She reports having depressed mood, poor energy, and increase in alcohol use (always drank daily, but now more consistently drinks 1/2 -1 bottle of wine). Patient reports poor sleep (4 hrs nightly), though takes naps during day, drinks alcohol at night which indicates poor sleep hygiene. No anhedonia, changes in appetite, changes in concentration, irritability, guilt. Patient reports mood fluctuations throughout the day, fear of abandonment, often seeks for help. She reports having poor coping strategies, states she feels unwell whenever she has big change in life (graduations, working). She reports not being adherent to prescribed Celexa, says she tried for 2-3 weeks and didn't feel a change. Discussed with patient and mother at bedside risks and benefits of psychiatric hospitalization. Had discussion on psychosocial stressors still being present even after psychiatric hospitalization. Patient motivated to quit alcohol, gain coping strategies, trying to be adherent to medications. She states that she did not carry out plan to end life because she is worried about how her family would feel if she were to attempt it, as her SA in 2018 devastated family. She denies AVH, HI, sustained manic symptoms. Mother states she would take patient to Alcohol Anonymous meetings, make appointment to see psychiatrist soon, and help her with alcohol use. Patient in agreement. Safety planning performed.    Spoke with psychiatrist Dr. Mckinnon at [phone]. He states that he first inherited patient on Wellbutrin, to which he noticed hypomanic symptoms of at least increase in goal directed activities. He switched her to Lamotrigine to 150mg daily. Patient self tapered off this medication as she reported not feeling well on it, but recently became depressed, so he prescribed her Celexa. He last saw patient in December, attempted to schedule appointment with pt in January but she did not return phone call. No recent note on suicidality, concerns for self harm during last visit. He is unsure how she is doing recently since he has not seen her this month. He knows that she has nonadherence to medications.     See ED behavioral health note for collateral from mother. Patient is a 23 y/o woman, employed at [employer], single, obtaining masters degree at [employer], no dependents, lives with family, with PPHx of Depression, anxiety, Cyclothymia? 1 past psych hospitalization ([facility] 2018) after SA by overdosing on Wellbutrin, no NSSIB, daily alcohol use, currently in treatment w/weekly therapy, sees psychiatrist Dr. Mckinnon, who was BIB self after going to [facility] for SI w/thoughts of overdosing on Celexa. Patient was initially in medical ED to evaluate for possible alcohol withdrawal, no signs of withdrawal noted.    Patient seen with mother at bedside. Upon approach, patient tearful. She states that she feels tired, is under a lot of pressure and stress from school, work, having fight with friends during vacation in [place] this weekend, father and grandmother having health scares this year. She reports having depressed mood, poor energy, and increase in alcohol use (always drank daily, but now more consistently drinks 1/2 -1 bottle of wine). Patient reports poor sleep (4 hrs nightly), though takes naps during day, drinks alcohol at night which indicates poor sleep hygiene. No anhedonia, changes in appetite, changes in concentration, irritability, guilt. Patient reports mood fluctuations throughout the day, fear of abandonment, often seeks for help. She reports having poor coping strategies, states she feels unwell whenever she has big change in life (graduations, working). She reports not being adherent to prescribed Celexa, says she tried for 2-3 weeks and didn't feel a change. Discussed with patient and mother at bedside risks and benefits of psychiatric hospitalization. Had discussion on psychosocial stressors still being present even after psychiatric hospitalization. Patient motivated to quit alcohol, gain coping strategies, trying to be adherent to medications. She states that she did not carry out plan to end life because she is worried about how her family would feel if she were to attempt it, as her SA in 2018 devastated family. She denies AVH, HI, sustained manic symptoms. Mother states she would take patient to Alcohol Anonymous meetings, make appointment to see psychiatrist soon, and help her with alcohol use. Patient in agreement. Safety planning performed.    Spoke with psychiatrist Dr. Mckinnon at [phone]. He states that he first inherited patient on Wellbutrin, to which he noticed hypomanic symptoms of at least increase in goal directed activities. He switched her to Lamotrigine to 150mg daily. Patient self tapered off this medication as she reported not feeling well on it, but recently became depressed, so he prescribed her Celexa. He last saw patient in December, attempted to schedule appointment with pt in January but she did not return phone call. No recent note on suicidality, concerns for self harm during last visit. He is unsure how she is doing recently since he has not seen her this month. He knows that she has nonadherence to medications.     See ED behavioral health note for collateral from mother.

## 2023-01-30 NOTE — ED BEHAVIORAL HEALTH ASSESSMENT NOTE - DESCRIPTION
recent seizure due to Wellbutrin overdose Lives in [place] with parents. Has 1 older sister, 1 younger brother Calm and cooperative    Vital Signs Last 24 Hrs  T(C): 36.9 (30 Jan 2023 14:01), Max: 36.9 (30 Jan 2023 14:01)  T(F): 98.4 (30 Jan 2023 14:01), Max: 98.4 (30 Jan 2023 14:01)  HR: 84 (30 Jan 2023 14:01) (84 - 84)  BP: 144/103 (30 Jan 2023 14:01) (144/103 - 144/103)  BP(mean): --  RR: 19 (30 Jan 2023 14:01) (19 - 19)  SpO2: 98% (30 Jan 2023 14:01) (98% - 98%)    Parameters below as of 30 Jan 2023 14:01  Patient On (Oxygen Delivery Method): room air n/a

## 2023-01-30 NOTE — ED ADULT NURSE NOTE - HPI (INCLUDE ILLNESS QUALITY, SEVERITY, DURATION, TIMING, CONTEXT, MODIFYING FACTORS, ASSOCIATED SIGNS AND SYMPTOMS)
pt has depression, overwhelmed with her stydying, pt is drinking everyday, has plan how to end her life. long time Hx of depression.

## 2023-01-30 NOTE — ED PROVIDER NOTE - CLINICAL SUMMARY MEDICAL DECISION MAKING FREE TEXT BOX
21 yo hx of depression, prior SI and [facility] hospitalization for intentional overdose on bupropion, pw with SI. 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 Pt endorse SI, wanting to overdose on pills like she did in 2018. Pt is tearful and states she is "embarrassed". History obtained from patient with mother outside of room. Pt states she on drinks several glasses to a bottle of on wine daily. Last drink noon yesterday. No hx of ETOH withdrawal, or seizure. +anxious affect on exam, tearful, clear lungs, non tender abdomen, no tremors, no tongue fasciculations. Low suspicion for ETOH withdrawal, will obtain psych consult after medically clear. Dispo per psych. Would consider psych admission if recommended by psych.

## 2023-01-31 ENCOUNTER — NON-APPOINTMENT (OUTPATIENT)
Age: 23
End: 2023-01-31

## 2023-01-31 DIAGNOSIS — F33.2 MAJOR DEPRESSIVE DISORDER, RECURRENT SEVERE WITHOUT PSYCHOTIC FEATURES: ICD-10-CM

## 2023-01-31 LAB
COVID-19 SPIKE DOMAIN AB INTERP: POSITIVE
COVID-19 SPIKE DOMAIN ANTIBODY RESULT: >250 U/ML — HIGH
SARS-COV-2 IGG+IGM SERPL QL IA: >250 U/ML — HIGH
SARS-COV-2 IGG+IGM SERPL QL IA: POSITIVE

## 2023-01-31 NOTE — ED BEHAVIORAL HEALTH NOTE - BEHAVIORAL HEALTH NOTE
High Risk Log:   writer called patient [phone] and  she states that she has been keeping busy with her mom. She states that she spoke to her therapist today. Worker encouraged patient to utilize her safety plan.

## 2023-07-09 NOTE — ED BEHAVIORAL HEALTH ASSESSMENT NOTE - SUICIDE PROTECTIVE FACTORS
Engaged in work or school/Responsibility to family and others/Fear of death or dying due to pain/suffering/Positive therapeutic relationships/Identifies reasons for living/Future oriented/Supportive social network or family COVID-19

## 2023-08-25 ENCOUNTER — NON-APPOINTMENT (OUTPATIENT)
Age: 23
End: 2023-08-25

## 2023-08-30 NOTE — ED PROVIDER NOTE - COVID-19 RESULT DATE/TIME
Hospital Medicine History & Physical Note    Date of Service  8/30/2023    Primary Care Physician  Pcp Pt States None    Consultants  none    Specialist Names: none    Code Status  Prior    Chief Complaint  Chief Complaint   Patient presents with    ALOC     New onset this morning.        History of Presenting Illness  Bishop Bucio is a 54 y.o. male w/ PMH of depression who presented 8/30/2023 with AMS. Pt was last admitted on 5/23 after being found down, noted to have intentional benadryl overdose that required intubation and was on legal hold due to suicidal ideation ultimately discharged to [facility]. Pt presents today after having EMS called on him by [facility] staff who were attempting to remove him from the premesis when he became unresponsive. He is incapable of providing history, he is alert but answers incomprehensibly to questions, GCS is 12 and appears to be protecting airway.   In the ED vitals showed tachycardia and elevated BP on RA. Labs showed slight hypokalemia. Etoh, tylenol, ASA were undetectable. UDS positive for only cannabis. CT head w/o wnl. CXR and UA not suggestive of infxn or other acute pathology. EKG showed no signs of acute ischemia or arrhythmia. He was given 1 L bolus of LR and ativan in the ED.     I discussed the plan of care with patient.    Review of Systems  Review of Systems   Unable to perform ROS: Mental status change       Past Medical History   has a past medical history of Complaint of nasal congestion (5/28/2023).    Surgical History   has no past surgical history on file.     Family History  family history is not on file.   Family history reviewed with patient. There is no family history that is pertinent to the chief complaint.     Social History   reports that he has never smoked. He has never used smokeless tobacco. He reports that he does not drink alcohol and does not use drugs.    Allergies  Allergies   Allergen Reactions    Pcn [Penicillins] Rash     Per  patient        Medications  None       Physical Exam  Temp:  [36.1 °C (97 °F)] 36.1 °C (97 °F)  Pulse:  [108-121] 117  Resp:  [16-20] 18  BP: (153-161)/(87-97) 161/92  SpO2:  [93 %-95 %] 95 %  Blood Pressure: (!) 161/92   Temperature: 36.1 °C (97 °F)   Pulse: (!) 117   Respiration: 18   Pulse Oximetry: 95 %       Physical Exam  Constitutional:       Appearance: Normal appearance.   HENT:      Head: Normocephalic and atraumatic.      Nose: Nose normal.      Mouth/Throat:      Mouth: Mucous membranes are moist.      Pharynx: Oropharynx is clear.   Eyes:      Conjunctiva/sclera: Conjunctivae normal.      Pupils: Pupils are equal, round, and reactive to light.   Cardiovascular:      Rate and Rhythm: Normal rate and regular rhythm.      Heart sounds: No murmur heard.  Pulmonary:      Effort: Pulmonary effort is normal.      Breath sounds: No wheezing, rhonchi or rales.   Abdominal:      General: There is no distension.      Palpations: Abdomen is soft.      Tenderness: There is abdominal tenderness in the suprapubic area. There is no guarding or rebound.   Musculoskeletal:         General: No swelling or tenderness.      Cervical back: Normal range of motion and neck supple.   Skin:     General: Skin is warm and dry.   Neurological:      Mental Status: He is alert. He is disoriented and confused.      GCS: GCS eye subscore is 4. GCS verbal subscore is 2. GCS motor subscore is 6.      Cranial Nerves: No facial asymmetry.   Psychiatric:         Attention and Perception: He is inattentive.         Mood and Affect: Affect is inappropriate.         Laboratory:  Recent Labs     08/30/23  0733   WBC 7.7   RBC 5.08   HEMOGLOBIN 14.9   HEMATOCRIT 45.5   MCV 89.6   MCH 29.3   MCHC 32.7   RDW 45.2   PLATELETCT 219   MPV 9.6     Recent Labs     08/30/23  0733   SODIUM 139   POTASSIUM 3.5*   CHLORIDE 104   CO2 22   GLUCOSE 149*   BUN 9   CREATININE 1.00   CALCIUM 8.5     Recent Labs     08/30/23  0733   ALTSGPT 32   ASTSGOT 28  "  ALKPHOSPHAT 76   TBILIRUBIN 0.5   GLUCOSE 149*     Recent Labs     08/30/23  0733   APTT 24.3*   INR 1.03     No results for input(s): \"NTPROBNP\" in the last 72 hours.      No results for input(s): \"TROPONINT\" in the last 72 hours.    Imaging:  CT-HEAD W/O   Final Result      Unremarkable noncontrast CT examination of the brain.         DX-CHEST-PORTABLE (1 VIEW)   Final Result      No acute cardiac or pulmonary abnormalities are identified.          X-Ray:  My impression is: clear  EKG:  My impression is: ST rate 110s, QTC ~450, borderline ST depression in I and V5-6    Assessment/Plan:  Justification for Admission Status  I anticipate this patient is appropriate for observation status at this time because overdose    Patient will need a Telemetry bed on EMERGENCY service .  The need is secondary to overdose.    * Diphenhydramine overdose of undetermined intent, initial encounter- (present on admission)  Assessment & Plan  Signs of anticholingergic toxicity in pt found with Benadryl bottle with otherwise neg toxicology workup and recent intentional benadryl OD, suspect recurrent benadryl OD  -Supportive care  -QTC wnl, PRN EKG for signs of prolongation on tele  -NPO until mentation clears  -PRN ativan for agitation  -Aspiration, seizure, fall precautions  -Monitor on tele    Urinary retention  Assessment & Plan  Due to benadryl toxicity   -Bladder scan and monitor for signs of obstruction     Depression  Assessment & Plan  With recent suicide attempt also due to benadryl OD on 5/23  -Legal hold  -Psych consulted, will appreciate recs        VTE prophylaxis: enoxaparin ppx  " 22-Nov-2021 13:22

## 2023-11-14 ENCOUNTER — NON-APPOINTMENT (OUTPATIENT)
Age: 23
End: 2023-11-14

## 2023-11-27 ENCOUNTER — NON-APPOINTMENT (OUTPATIENT)
Age: 23
End: 2023-11-27

## 2023-12-28 NOTE — ED PROVIDER NOTE - BIRTH SEX
Patient Specific Counseling (Will Not Stick From Patient To Patient): Discussed patch testing
Detail Level: Zone
Female

## 2024-06-18 ENCOUNTER — NON-APPOINTMENT (OUTPATIENT)
Age: 24
End: 2024-06-18

## 2024-10-10 ENCOUNTER — NON-APPOINTMENT (OUTPATIENT)
Age: 24
End: 2024-10-10

## 2024-11-08 ENCOUNTER — OUTPATIENT (OUTPATIENT)
Dept: OUTPATIENT SERVICES | Facility: HOSPITAL | Age: 24
LOS: 1 days | End: 2024-11-08

## 2024-11-08 ENCOUNTER — NON-APPOINTMENT (OUTPATIENT)
Age: 24
End: 2024-11-08

## 2024-11-08 ENCOUNTER — APPOINTMENT (OUTPATIENT)
Dept: INTERNAL MEDICINE | Facility: CLINIC | Age: 24
End: 2024-11-08

## 2024-11-08 ENCOUNTER — TRANSCRIPTION ENCOUNTER (OUTPATIENT)
Age: 24
End: 2024-11-08

## 2024-11-11 ENCOUNTER — TRANSCRIPTION ENCOUNTER (OUTPATIENT)
Age: 24
End: 2024-11-11

## 2024-11-15 ENCOUNTER — TRANSCRIPTION ENCOUNTER (OUTPATIENT)
Age: 24
End: 2024-11-15

## 2024-11-16 ENCOUNTER — TRANSCRIPTION ENCOUNTER (OUTPATIENT)
Age: 24
End: 2024-11-16

## 2024-11-21 NOTE — PATIENT PROFILE ADULT. - COMFORT LEVEL, ACCEPTABLE
syrup Take 3 mLs by mouth daily (Patient not taking: Reported on 10/10/2019) 90 mL 5     No current facility-administered medications for this visit.       ALLERGIES:   No Known Allergies    SOCIAL HISTORY:  Social History     Tobacco Use    Smoking status: Never     Passive exposure: Yes    Smokeless tobacco: Never    Tobacco comments:     parents smoke outside   Substance Use Topics    Alcohol use: No       Pertinent ROS:  Review of Systems  Skin: Denies any new changing, growing or bleeding lesions or rashes except as described in the HPI   Constitutional: Denies fevers, chills, and malaise.    PHYSICAL EXAM:   Pulse 93   Temp 97.4 °F (36.3 °C)   Ht 1.524 m (5')   Wt 70.9 kg (156 lb 3.2 oz)   SpO2 99%   BMI 30.51 kg/m²     The patient is generally well appearing, well nourished, alert and conversational. Affect is normal.    Cutaneous Exam:  Physical Exam  Focused exam of bilateral axilla and neck was performed    Diagnoses/exam findings/medical history pertinent to this visit are listed below:    Assessment:   Diagnosis Orders   1. Acanthosis nigricans        2. Irritant dermatitis  triamcinolone (KENALOG) 0.025 % ointment           Plan:  Acanthosis nigricans of axilla and neck  Irritant dermatitis of axilla related to AN  Patient describes rash/stinging in other areas but none active today  - discussed diagnosis, etiology, natural course, and treatment options. AN is related to insulin resistance and obesity  - start triamcinolone 0.025% ointment BID to affected areas. Use for 1 week before trying a new antiperspirant ([employer] antiperspirant OR Vanicream antiperspirant)  - I recommend only using fragrance-free products for sensitive skin  - reviewed labs in chart, last A1C 5.6  - recommend speak to pediatrician about weight management strategies  - will send a note to pediatrician    Return in about 3 months (around 2/21/2025) for acanthosis nigricans.    No future appointments.      Patient  3

## 2024-12-13 ENCOUNTER — NON-APPOINTMENT (OUTPATIENT)
Age: 24
End: 2024-12-13

## 2025-03-19 ENCOUNTER — NON-APPOINTMENT (OUTPATIENT)
Age: 25
End: 2025-03-19

## 2025-06-10 ENCOUNTER — TRANSCRIPTION ENCOUNTER (OUTPATIENT)
Age: 25
End: 2025-06-10